# Patient Record
Sex: FEMALE | Race: WHITE | NOT HISPANIC OR LATINO | URBAN - METROPOLITAN AREA
[De-identification: names, ages, dates, MRNs, and addresses within clinical notes are randomized per-mention and may not be internally consistent; named-entity substitution may affect disease eponyms.]

---

## 2020-07-07 ENCOUNTER — OFFICE (OUTPATIENT)
Dept: URBAN - METROPOLITAN AREA LAB 2 | Facility: LAB | Age: 65
End: 2020-07-07
Payer: COMMERCIAL

## 2020-07-07 DIAGNOSIS — Z11.59 ENCOUNTER FOR SCREENING FOR OTHER VIRAL DISEASES: ICD-10-CM

## 2020-07-07 PROCEDURE — 87633 RESP VIRUS 12-25 TARGETS: CPT | Performed by: INTERNAL MEDICINE

## 2020-07-07 PROCEDURE — U0002 COVID-19 LAB TEST NON-CDC: HCPCS | Performed by: INTERNAL MEDICINE

## 2020-07-10 ENCOUNTER — AMBULATORY SURGICAL CENTER (OUTPATIENT)
Dept: URBAN - METROPOLITAN AREA SURGERY 17 | Facility: SURGERY | Age: 65
End: 2020-07-10

## 2020-07-10 ENCOUNTER — OFFICE (OUTPATIENT)
Dept: URBAN - METROPOLITAN AREA PATHOLOGY 4 | Facility: PATHOLOGY | Age: 65
End: 2020-07-10
Payer: COMMERCIAL

## 2020-07-10 VITALS
WEIGHT: 230 LBS | SYSTOLIC BLOOD PRESSURE: 88 MMHG | SYSTOLIC BLOOD PRESSURE: 95 MMHG | TEMPERATURE: 98.4 F | DIASTOLIC BLOOD PRESSURE: 76 MMHG | HEART RATE: 70 BPM | HEIGHT: 66 IN | DIASTOLIC BLOOD PRESSURE: 68 MMHG | SYSTOLIC BLOOD PRESSURE: 103 MMHG | DIASTOLIC BLOOD PRESSURE: 66 MMHG | HEART RATE: 67 BPM | DIASTOLIC BLOOD PRESSURE: 53 MMHG | SYSTOLIC BLOOD PRESSURE: 107 MMHG | SYSTOLIC BLOOD PRESSURE: 161 MMHG | OXYGEN SATURATION: 98 % | HEART RATE: 71 BPM | DIASTOLIC BLOOD PRESSURE: 41 MMHG | DIASTOLIC BLOOD PRESSURE: 46 MMHG | SYSTOLIC BLOOD PRESSURE: 100 MMHG | HEART RATE: 62 BPM | RESPIRATION RATE: 7 BRPM | SYSTOLIC BLOOD PRESSURE: 135 MMHG | DIASTOLIC BLOOD PRESSURE: 67 MMHG | RESPIRATION RATE: 15 BRPM | HEART RATE: 61 BPM | HEART RATE: 64 BPM | HEART RATE: 79 BPM | TEMPERATURE: 97 F | DIASTOLIC BLOOD PRESSURE: 60 MMHG | HEART RATE: 63 BPM | RESPIRATION RATE: 16 BRPM | OXYGEN SATURATION: 99 % | OXYGEN SATURATION: 97 % | RESPIRATION RATE: 8 BRPM | SYSTOLIC BLOOD PRESSURE: 125 MMHG | HEART RATE: 72 BPM | RESPIRATION RATE: 1 BRPM | SYSTOLIC BLOOD PRESSURE: 96 MMHG | DIASTOLIC BLOOD PRESSURE: 83 MMHG | SYSTOLIC BLOOD PRESSURE: 93 MMHG | RESPIRATION RATE: 17 BRPM | RESPIRATION RATE: 13 BRPM | SYSTOLIC BLOOD PRESSURE: 84 MMHG | DIASTOLIC BLOOD PRESSURE: 61 MMHG | HEART RATE: 66 BPM | OXYGEN SATURATION: 100 % | HEART RATE: 60 BPM | SYSTOLIC BLOOD PRESSURE: 87 MMHG | HEART RATE: 59 BPM | SYSTOLIC BLOOD PRESSURE: 154 MMHG | SYSTOLIC BLOOD PRESSURE: 174 MMHG | DIASTOLIC BLOOD PRESSURE: 55 MMHG | RESPIRATION RATE: 10 BRPM | HEART RATE: 58 BPM | OXYGEN SATURATION: 94 % | DIASTOLIC BLOOD PRESSURE: 65 MMHG | SYSTOLIC BLOOD PRESSURE: 115 MMHG | OXYGEN SATURATION: 93 % | HEART RATE: 68 BPM

## 2020-07-10 DIAGNOSIS — D12.0 BENIGN NEOPLASM OF CECUM: ICD-10-CM

## 2020-07-10 DIAGNOSIS — K52.89 OTHER SPECIFIED NONINFECTIVE GASTROENTERITIS AND COLITIS: ICD-10-CM

## 2020-07-10 DIAGNOSIS — K31.7 POLYP OF STOMACH AND DUODENUM: ICD-10-CM

## 2020-07-10 DIAGNOSIS — D12.4 BENIGN NEOPLASM OF DESCENDING COLON: ICD-10-CM

## 2020-07-10 DIAGNOSIS — K57.30 DIVERTICULOSIS OF LARGE INTESTINE WITHOUT PERFORATION OR ABS: ICD-10-CM

## 2020-07-10 DIAGNOSIS — K63.5 POLYP OF COLON: ICD-10-CM

## 2020-07-10 DIAGNOSIS — D12.5 BENIGN NEOPLASM OF SIGMOID COLON: ICD-10-CM

## 2020-07-10 DIAGNOSIS — K62.1 RECTAL POLYP: ICD-10-CM

## 2020-07-10 DIAGNOSIS — R10.13 EPIGASTRIC PAIN: ICD-10-CM

## 2020-07-10 DIAGNOSIS — K29.70 GASTRITIS, UNSPECIFIED, WITHOUT BLEEDING: ICD-10-CM

## 2020-07-10 DIAGNOSIS — K31.89 OTHER DISEASES OF STOMACH AND DUODENUM: ICD-10-CM

## 2020-07-10 DIAGNOSIS — D12.8 BENIGN NEOPLASM OF RECTUM: ICD-10-CM

## 2020-07-10 PROBLEM — R19.7 DIARRHEA: Status: ACTIVE | Noted: 2020-07-10

## 2020-07-10 PROBLEM — K21.9 GERD - GASTROESOPHAGEAL REFLUX DISEASE: Status: ACTIVE | Noted: 2020-07-10

## 2020-07-10 LAB
GI HISTOLOGY: A. UNSPECIFIED: (no result)
GI HISTOLOGY: B. SELECT: (no result)
GI HISTOLOGY: C. UNSPECIFIED: (no result)
GI HISTOLOGY: D. UNSPECIFIED: (no result)
GI HISTOLOGY: E. UNSPECIFIED: (no result)
GI HISTOLOGY: F. UNSPECIFIED: (no result)
GI HISTOLOGY: G. UNSPECIFIED: (no result)
GI HISTOLOGY: H. UNSPECIFIED: (no result)
GI HISTOLOGY: PDF REPORT: (no result)

## 2020-07-10 PROCEDURE — 45380 COLONOSCOPY AND BIOPSY: CPT | Mod: 59 | Performed by: INTERNAL MEDICINE

## 2020-07-10 PROCEDURE — 88305 TISSUE EXAM BY PATHOLOGIST: CPT | Performed by: INTERNAL MEDICINE

## 2020-07-10 PROCEDURE — 43239 EGD BIOPSY SINGLE/MULTIPLE: CPT | Performed by: INTERNAL MEDICINE

## 2020-07-10 PROCEDURE — 45385 COLONOSCOPY W/LESION REMOVAL: CPT | Performed by: INTERNAL MEDICINE

## 2020-07-10 NOTE — SERVICEHPINOTES
65 yo F with h/o HTN, anemia, HLD, BEBA, depression, colon polyps. Last CN 2018 at [place] (path- TSA and TA). No fam hx GI d/o that she knows of, though she knows little about her fam hx. c/o multiple GI symptoms. c/o abd pain, swelling, change in BMs, constipation, diarrhea, heartburn, rectal pain, incontinence of stool.Patient reports alternating constipation and explsive diarrhea. She reports black stools at times, though does take Pepto-Bismol (she is unsure of correlation with when she takes it). She reports burning epigastric pain. She does not think she has had EGD before. She occasionally takes NSAIDs. Discussed r/b/a of the procedure with the patient in the pre-op area.

## 2020-07-29 PROBLEM — K63.5 POLYP OF COLON: Status: ACTIVE | Noted: 2020-07-10

## 2020-07-29 PROBLEM — K31.7 POLYP OF STOMACH AND DUODENUM: Status: ACTIVE | Noted: 2020-07-10

## 2020-08-12 RX ORDER — AMLODIPINE BESYLATE 10 MG/1
10 TABLET ORAL
COMMUNITY

## 2020-08-12 RX ORDER — FUROSEMIDE 20 MG/1
20 TABLET ORAL
COMMUNITY
End: 2020-08-13 | Stop reason: ALTCHOICE

## 2020-08-12 RX ORDER — CLONIDINE HYDROCHLORIDE 0.1 MG/1
0.1 TABLET ORAL 2 TIMES DAILY
COMMUNITY
End: 2020-08-13 | Stop reason: ALTCHOICE

## 2020-08-12 RX ORDER — METOPROLOL SUCCINATE 100 MG/1
100 TABLET, EXTENDED RELEASE ORAL
COMMUNITY
End: 2020-08-13 | Stop reason: ALTCHOICE

## 2020-08-12 RX ORDER — BENAZEPRIL HYDROCHLORIDE 40 MG/1
40 TABLET, FILM COATED ORAL
COMMUNITY
End: 2020-08-13 | Stop reason: ALTCHOICE

## 2020-08-13 ENCOUNTER — OFFICE VISIT (OUTPATIENT)
Dept: CARDIOLOGY | Facility: CLINIC | Age: 65
End: 2020-08-13

## 2020-08-13 ENCOUNTER — TELEPHONE (OUTPATIENT)
Dept: CARDIOLOGY | Facility: CLINIC | Age: 65
End: 2020-08-13

## 2020-08-13 VITALS
WEIGHT: 235 LBS | DIASTOLIC BLOOD PRESSURE: 78 MMHG | HEIGHT: 66 IN | HEART RATE: 66 BPM | BODY MASS INDEX: 37.77 KG/M2 | SYSTOLIC BLOOD PRESSURE: 165 MMHG

## 2020-08-13 DIAGNOSIS — E78.5 HYPERLIPIDEMIA, UNSPECIFIED HYPERLIPIDEMIA TYPE: ICD-10-CM

## 2020-08-13 DIAGNOSIS — E66.01 MORBID OBESITY WITH BMI OF 45.0-49.9, ADULT (HCC): ICD-10-CM

## 2020-08-13 DIAGNOSIS — I10 ESSENTIAL HYPERTENSION: Primary | ICD-10-CM

## 2020-08-13 DIAGNOSIS — R06.02 SHORTNESS OF BREATH: ICD-10-CM

## 2020-08-13 PROCEDURE — 99203 OFFICE O/P NEW LOW 30 MIN: CPT | Performed by: INTERNAL MEDICINE

## 2020-08-13 RX ORDER — OLMESARTAN MEDOXOMIL AND HYDROCHLOROTHIAZIDE 40/12.5 40; 12.5 MG/1; MG/1
1 TABLET ORAL DAILY
Qty: 30 TABLET | Refills: 6 | Status: SHIPPED | OUTPATIENT
Start: 2020-08-13 | End: 2021-02-18 | Stop reason: SDUPTHER

## 2020-08-13 RX ORDER — BISOPROLOL FUMARATE AND HYDROCHLOROTHIAZIDE 10; 6.25 MG/1; MG/1
1 TABLET ORAL DAILY
Qty: 30 TABLET | Refills: 6 | Status: SHIPPED | OUTPATIENT
Start: 2020-08-13 | End: 2021-03-15

## 2020-08-13 NOTE — TELEPHONE ENCOUNTER
Patient states she called her insurance and was told that we are out of network    Will send to  and have scheduling call patient back.  ALP

## 2020-08-13 NOTE — PROGRESS NOTES
New pt   bp    Subjective:        [facility]  Cardiology Consult Note    Patient Identification:  Name: Linette Vergara  Age: 64 y.o.  Sex: female  :  1955  MRN: [id number]             CC  H/o stress cardiomyopathy  htn  For 7 yr    bp not well controlled    sob      History of Present Illness:   64-year-old female was diagnosed to have stress cardiomyopathy approximately 10 to 15 years ago now has been complaining of shortness of breath    Linetet Vergara has been complaining of the shortness of breath mild-to-moderate in intensity with mild-to-moderate usually relieved with rest associated with anxiety and fatigue      Comorbid cardiac risk factors:     Past Medical History:  Past Medical History:   Diagnosis Date   • Hypertension    • Sleep apnea      Past Surgical History:  Past Surgical History:   Procedure Laterality Date   • BILATERAL BREAST REDUCTION     • GALLBLADDER SURGERY     • HYSTERECTOMY     • TONSILLECTOMY        Allergies:  Allergies   Allergen Reactions   • Codeine Itching   • Morphine Itching     Home Meds:    (Not in a hospital admission)  Current Meds:   [unfilled]  Social History:   Social History     Tobacco Use   • Smoking status: Former Smoker   • Smokeless tobacco: Never Used   Substance Use Topics   • Alcohol use: Yes     Comment: OCCASSIONAL      Family History:  Family History   Problem Relation Age of Onset   • Heart failure Mother    • Hypertension Mother         Review of Systems    Constitutional: No weakness,fatigue, fever, rigors, chills   Eyes: No vision changes, eye pain   ENT/oropharynx: No difficulty swallowing, sore throat, epistaxis, changes in hearing   Cardiovascular: No chest pain, chest tightness, palpitations, paroxysmal nocturnal dyspnea, orthopnea, diaphoresis, dizziness / syncopal episode   Respiratory: No shortness of breath, dyspnea on exertion, cough, wheezing hemoptysis   Gastrointestinal: No abdominal pain, nausea, vomiting, diarrhea, bloody  stools   Genitourinary: No hematuria, dysuria   Neurological: No headache, tremors, numbness,  one-sided weakness    Musculoskeletal: No cramps, myalgias,  joint pain, joint swelling   Integument: No rash, edema       IMPRESSION OF HEART CATHETERIZATION:   1. Minimal epicardial coronary arteries as outlined above  2. Reduced left ventricular systolic function.   3. Elevated left ventricular filling pressures   4. Findings consistent with Takotsubo cardioyopathy   RECOMMENDATION:   1. Aggressive risk factor modification   2. Maximization of medical management     Constitutional:  Heart Rate:  [66] 66  BP: (165)/(78) 165/78    Physical Exam   General:  Appears in no acute distress  Eyes: PERTL,  HEENT:  No JVD. Thyroid not visibly enlarged. No mucosal pallor or cyanosis  Respiratory: Respirations regular and unlabored at rest. BBS with good air entry in all fields. No crackles, rubs or wheezes auscultated  Cardiovascular: S1S2 Regular rate and rhythm. No murmur, rub or gallop auscultated. No carotid bruits. DP/PT pulses    . No pretibial pitting edema  Gastrointestinal: Abdomen soft, flat, non tender. Bowel sounds present. No hepatosplenomegaly. No ascites  Musculoskeletal: SHANKS x4. No abnormal movements  Extremities: No digital clubbing or cyanosis  Skin: Color pink. Skin warm and dry to touch. No rashes  No xanthoma  Neuro: AAO x3 CN II-XII grossly intact          Procedures        Cardiographics  ECG:     Telemetry:    Echocardiogram:     Imaging  Chest X-ray:     Lab Review               @[email or web address]@              Assessment:/ Recommendations / Plan:   Patient Active Problem List   Diagnosis   • Essential hypertension   • Morbid obesity with BMI of 45.0-49.9, adult (CMS/[facility])   • Hyperlipidemia                    ICD-10-CM ICD-9-CM   1. Essential hypertension I10 401.9   2. Shortness of breath R06.02 786.05   3. Morbid obesity with BMI of 45.0-49.9, adult (CMS/[facility]) E66.01 278.01    Z68.42 V85.42   4.  Hyperlipidemia, unspecified hyperlipidemia type E78.5 272.4     1. Shortness of breath  Considering the patient's symptoms as well as clinical situation and  EKG findings, along with cardiac risk factors, ischemic workup is necessary to rule out ischemic cardiomyopathy, stress induced arrhythmias, and functional capacity for diagnosis as well as prognostic consideration      2. Essential hypertension  Considering patient's medical condition as well as the risk factors, patient will require echocardiogram for further evaluation for the LV function, four-chamber evaluation, including the pressures, valvular function and  pericardial disease and pericardial effusion    - Stress Test With Myocardial Perfusion One Day  - Adult Transthoracic Echo Complete W/ Cont if Necessary Per Protocol  - Duplex Renal Artery - Bilateral Complete CAR    3. Morbid obesity with BMI of 45.0-49.9, adult (CMS/[facility])  Significant risk of obesity to CAD, HTN has been explained  Advantages of wt reduction has been explained    - Stress Test With Myocardial Perfusion One Day  - Adult Transthoracic Echo Complete W/ Cont if Necessary Per Protocol  - Duplex Renal Artery - Bilateral Complete CAR    4. Hyperlipidemia, unspecified hyperlipidemia type  Continue current treatment  - Stress Test With Myocardial Perfusion One Day  - Adult Transthoracic Echo Complete W/ Cont if Necessary Per Protocol  - Duplex Renal Artery - Bilateral Complete CAR      Diet    Wt loss    Echo, lexisca, renal artery stenosis    [address] diet    45 min exercise    See in 1 month    Labs/tests ordered for am    Elli Toro MD  8/13/2020, 15:00      EMR Dragon/Transcription:   Dictated utilizing Dragon dictation

## 2020-08-14 ENCOUNTER — TELEPHONE (OUTPATIENT)
Dept: CARDIOLOGY | Facility: CLINIC | Age: 65
End: 2020-08-14

## 2020-08-14 NOTE — TELEPHONE ENCOUNTER
----- Message from Pili Doyle RN sent at 8/13/2020  6:01 PM EDT -----  Regarding: STRESS TEST AND ECHO  Contact: [phone]  She said she called [employer] to inquire what would be covered.   She said [employer] told her we are out of network and she would be responsible for 100% because they could not find us.  I told her they were most likely looking for the office and that we are testing through the hospital      Please call her to clarify and assist with this-maybe an authorization needs to be created so her insurance something to look at   Thank you  Pili  ----- Message -----  From: Nandini Porras  Sent: 8/13/2020   4:05 PM EDT  To: Tracy Nolasco [facility] Clinical Pool  Subject: JUST HERE WHAT MEDS DOES SHE NEED TO STOP?         8/14/20 LVM TO CALL ME BACK RE: INS

## 2020-08-14 NOTE — TELEPHONE ENCOUNTER
"I am the on-call provider.  New patient of Dr. Toro yesterday.  He has had hypertension.  He has had swelling in hands and feet.  Had recent med changes.  She has been having intermittent periods where she feels \"horrible\" with intermittent headaches on the top of her head with facial flushing intermittently.  Looks like her meds were adjusted and she was started on bisoprolol HCTZ and olmesartan HCTZ.  Her amlodipine was continued.  Her furosemide, metoprolol succinate, benazepril and clonidine were all stopped yesterday and she started on a new regimen today.  She had pharmacist check her blood pressure earlier it was 173/85.  She was having these episodes before she saw Dr. Toro and did not know she was supposed to continue having them.  He has a work-up ordered including stress test and echocardiogram as well as renal artery duplex.  Advised her to go home and take her next blood pressure medicine that she was due to take and continue to monitor her blood pressure.  If she gets worse over the weekend she will need to be physically evaluated likely urgent care or ER.  She will call back Monday to update Dr. Toro on BP and symptoms for further plan of care.  "

## 2020-08-15 PROBLEM — R06.02 SHORTNESS OF BREATH: Status: ACTIVE | Noted: 2020-08-15

## 2020-08-17 ENCOUNTER — TELEPHONE (OUTPATIENT)
Dept: CARDIOLOGY | Facility: CLINIC | Age: 65
End: 2020-08-17

## 2020-08-17 NOTE — TELEPHONE ENCOUNTER
PATIENT B/P BETTER TODAY . SHES GOING TO TRY SWITCHING TIMES WHEN SHE TAKES IT TO SEE IF THAT WILL HELP

## 2021-02-18 RX ORDER — OLMESARTAN MEDOXOMIL AND HYDROCHLOROTHIAZIDE 40/12.5 40; 12.5 MG/1; MG/1
1 TABLET ORAL DAILY
Qty: 90 TABLET | Refills: 0 | Status: SHIPPED | OUTPATIENT
Start: 2021-02-18 | End: 2021-06-04

## 2021-03-15 RX ORDER — BISOPROLOL FUMARATE AND HYDROCHLOROTHIAZIDE 10; 6.25 MG/1; MG/1
TABLET ORAL
Qty: 30 TABLET | Refills: 0 | Status: SHIPPED | OUTPATIENT
Start: 2021-03-15 | End: 2021-04-12

## 2021-03-16 ENCOUNTER — BULK ORDERING (OUTPATIENT)
Dept: CASE MANAGEMENT | Facility: OTHER | Age: 66
End: 2021-03-16

## 2021-03-16 DIAGNOSIS — Z23 IMMUNIZATION DUE: ICD-10-CM

## 2021-04-12 RX ORDER — BISOPROLOL FUMARATE AND HYDROCHLOROTHIAZIDE 10; 6.25 MG/1; MG/1
TABLET ORAL
Qty: 30 TABLET | Refills: 3 | Status: SHIPPED | OUTPATIENT
Start: 2021-04-12 | End: 2021-04-14

## 2021-04-15 RX ORDER — BISOPROLOL FUMARATE AND HYDROCHLOROTHIAZIDE 10; 6.25 MG/1; MG/1
TABLET ORAL
Qty: 30 TABLET | Refills: 3 | Status: SHIPPED | OUTPATIENT
Start: 2021-04-15 | End: 2021-10-26 | Stop reason: SDUPTHER

## 2021-06-04 RX ORDER — OLMESARTAN MEDOXOMIL AND HYDROCHLOROTHIAZIDE 40/12.5 40; 12.5 MG/1; MG/1
TABLET ORAL
Qty: 90 TABLET | Refills: 0 | Status: SHIPPED | OUTPATIENT
Start: 2021-06-04 | End: 2021-09-03

## 2021-09-03 RX ORDER — OLMESARTAN MEDOXOMIL AND HYDROCHLOROTHIAZIDE 40/12.5 40; 12.5 MG/1; MG/1
TABLET ORAL
Qty: 90 TABLET | Refills: 0 | Status: SHIPPED | OUTPATIENT
Start: 2021-09-03 | End: 2021-12-06

## 2021-09-30 RX ORDER — BISOPROLOL FUMARATE AND HYDROCHLOROTHIAZIDE 10; 6.25 MG/1; MG/1
TABLET ORAL
Qty: 30 TABLET | Refills: 3 | OUTPATIENT
Start: 2021-09-30

## 2021-10-25 RX ORDER — BISOPROLOL FUMARATE AND HYDROCHLOROTHIAZIDE 10; 6.25 MG/1; MG/1
TABLET ORAL
Qty: 30 TABLET | Refills: 3 | OUTPATIENT
Start: 2021-10-25

## 2021-10-26 RX ORDER — BISOPROLOL FUMARATE AND HYDROCHLOROTHIAZIDE 10; 6.25 MG/1; MG/1
1 TABLET ORAL DAILY
Qty: 30 TABLET | Refills: 0 | Status: SHIPPED | OUTPATIENT
Start: 2021-10-26 | End: 2021-11-09

## 2021-11-09 ENCOUNTER — OFFICE VISIT (OUTPATIENT)
Dept: CARDIOLOGY | Facility: CLINIC | Age: 66
End: 2021-11-09

## 2021-11-09 VITALS
HEART RATE: 48 BPM | SYSTOLIC BLOOD PRESSURE: 118 MMHG | OXYGEN SATURATION: 98 % | BODY MASS INDEX: 37.77 KG/M2 | DIASTOLIC BLOOD PRESSURE: 68 MMHG | WEIGHT: 235 LBS | HEIGHT: 66 IN

## 2021-11-09 DIAGNOSIS — I10 ESSENTIAL HYPERTENSION: ICD-10-CM

## 2021-11-09 DIAGNOSIS — E78.5 HYPERLIPIDEMIA, UNSPECIFIED HYPERLIPIDEMIA TYPE: ICD-10-CM

## 2021-11-09 DIAGNOSIS — E66.9 OBESITY (BMI 30-39.9): ICD-10-CM

## 2021-11-09 DIAGNOSIS — R00.1 BRADYCARDIA, SINUS: Primary | ICD-10-CM

## 2021-11-09 PROCEDURE — 93000 ELECTROCARDIOGRAM COMPLETE: CPT

## 2021-11-09 PROCEDURE — 99214 OFFICE O/P EST MOD 30 MIN: CPT

## 2021-11-09 RX ORDER — ESCITALOPRAM OXALATE 5 MG/1
TABLET ORAL
COMMUNITY

## 2021-11-09 RX ORDER — CLOBETASOL PROPIONATE 0.5 MG/G
OINTMENT TOPICAL
COMMUNITY
Start: 2021-10-27

## 2021-11-09 RX ORDER — GLIMEPIRIDE 4 MG/1
TABLET ORAL
COMMUNITY
Start: 2021-09-08

## 2021-11-09 RX ORDER — PIOGLITAZONEHYDROCHLORIDE 15 MG/1
TABLET ORAL DAILY
COMMUNITY

## 2021-11-09 RX ORDER — BISOPROLOL FUMARATE AND HYDROCHLOROTHIAZIDE 10; 6.25 MG/1; MG/1
0.5 TABLET ORAL DAILY
Qty: 30 TABLET | Refills: 6 | Status: SHIPPED | OUTPATIENT
Start: 2021-11-09 | End: 2022-11-21

## 2021-11-09 NOTE — PROGRESS NOTES
Subjective:        Linette Vergara is a 65 y.o. female who here for follow up    Chief Complaint   Patient presents with   • Follow-up     1 yr      Hypertension  HPI  This is a 65-year-old female, who is new to me.  She has a history of hypertension, hyperlipidemia, obesity.  She was last seen in August 2020 as a new patient for hypertension management.  She had stress test and echo ordered but never had completed due to out-of-pocket cost.    She follows up in office today for hypertension.  She denies any chest pain or shortness of breath.  She denies any syncopal episodes or near syncopal episodes.    The following portions of the patient's history were reviewed and updated as appropriate: allergies, current medications, past family history, past medical history, past social history, past surgical history and problem list.    Past Medical History:   Diagnosis Date   • Hypertension    • Sleep apnea          reports that she has quit smoking. She has never used smokeless tobacco. She reports current alcohol use. She reports that she does not use drugs.     Family History   Problem Relation Age of Onset   • Heart failure Mother    • Hypertension Mother        ROS     Review of Systems  Constitutional: No wt loss, fever, fatigue  Gastrointestinal: No nausea, abdominal pain  Behavioral/Psych: No insomnia or anxiety  Cardiovascular no chest pain or shortness of breath      Objective:           Vitals and nursing note reviewed.   Constitutional:       Appearance: Well-developed.   HENT:      Head: Normocephalic.      Right Ear: External ear normal.      Left Ear: External ear normal.   Neck:      Vascular: No JVD.   Pulmonary:      Effort: Pulmonary effort is normal. No respiratory distress.      Breath sounds: Normal breath sounds. No stridor. No rales.   Cardiovascular:      Bradycardia present. Regular rhythm.      No gallop.   Pulses:     Intact distal pulses.   Abdominal:      General: Bowel sounds are normal.  There is no distension.      Palpations: Abdomen is soft.      Tenderness: There is no abdominal tenderness. There is no guarding.   Musculoskeletal: Normal range of motion.         General: No tenderness.      Cervical back: Normal range of motion. Skin:     General: Skin is warm.   Neurological:      Mental Status: Alert and oriented to person, place, and time.      Deep Tendon Reflexes: Reflexes are normal and symmetric.   Psychiatric:         Judgment: Judgment normal.           ECG 12 Lead    Date/Time: 11/9/2021 9:31 AM  Performed by: Pili Doyle APRN  Authorized by: Pili Doyle APRN   Comparison: compared with previous ECG from 8/13/2020  Similar to previous ECG  Comparison to previous ECG: Bradycardic on ECG today.  Rhythm: sinus bradycardia  Rate: bradycardic  BPM: 48  Other findings: non-specific ST-T wave changes    Clinical impression: abnormal EKG                  Current Outpatient Medications:   •  amLODIPine (NORVASC) 10 MG tablet, 10 mg., Disp: , Rfl:   •  bisoprolol-hydrochlorothiazide (ZIAC) 10-6.25 MG per tablet, Take 1 tablet by mouth Daily., Disp: 30 tablet, Rfl: 0  •  clobetasol (TEMOVATE) 0.05 % ointment, , Disp: , Rfl:   •  escitalopram (LEXAPRO) 5 MG tablet, escitalopram 5 mg tablet  TAKE ONE TABLET BY MOUTH DAILY, Disp: , Rfl:   •  esomeprazole (nexIUM) 20 MG capsule, 20 mg., Disp: , Rfl:   •  glimepiride (AMARYL) 4 MG tablet, , Disp: , Rfl:   •  olmesartan-hydrochlorothiazide (BENICAR HCT) 40-12.5 MG per tablet, TAKE ONE TABLET BY MOUTH DAILY, Disp: 90 tablet, Rfl: 0  •  pioglitazone (ACTOS) 15 MG tablet, Daily., Disp: , Rfl:   •  Probiotic Product ([employer] PO), , Disp: , Rfl:      Assessment:        Patient Active Problem List   Diagnosis   • Essential hypertension   • Morbid obesity with BMI of 45.0-49.9, adult (HCC)   • Hyperlipidemia   • Shortness of breath               Plan:   1. Hypertension: BP controlled, however HR 48 today.  Continue amlodipine,  bisoprolol, and olmesartan.  She was supposed to have an echo and stress test last year but never did due to out-of-pocket cost.  She states her insurance is changed and she will look into out-of-pocket cost to have these test done.    Importance of controlling hypertension and blood pressure  checkup on the regular basis has been explained. Hypertension as a silent killer has been discussed. Risk reduction of the weight and regular exercises to control the hypertension has been explained.    2. Bradycardia: Heart rate today 48.  She denies any chest pain, shortness of breath, syncope/near syncope.  Last office visit was 66.  She does not routinely check her heart rate at home with her BP.  ECG today shows SB, I will order a 2 week holter.  I will cut her Ziac in half.  She will check BP and heart rate at home and keep a diary.    Pros and cons of this new medication / change medication has been explained to  the patient. Possible side effects has been explained. Associated need of the blood  Work has been explained. Need for the compliance of the medication has been explained.    3. Hyperlipidemia: She reports her PCP manages her cholesterol labs.    Risk of the hyperlipidemia, importance of the treatment has been explained. Pros and cons of the statins has been explained. Regular blood workup as well as side effects including the liver failure, myelopathy death has been explained.    4. Obesity: BMI 37.93    Significant risk of obesity to CAD, HTN has been explained  Advantages of wt reduction has been explained                 No diagnosis found.    There are no diagnoses linked to this encounter.    COUNSELING:rebeca Salamanca was given to patient for the following topics: diagnostic results, risk factor reductions, impressions, risks and benefits of treatment options and importance of treatment compliance .       SMOKING COUNSELING:denies    She will wear 2-week Holter , I have decreased  bisoprolol to 1/2 tablet 5/3.25mg  She would like to review with insurance prior to stress and echo. Strongly encourgaed her to have stress and echo.  She also is to check with her insurance before wearing 2-week Holter.      Sincerely,   RAFA Rich  [facility]  11/09/21  09:30 EST    EMR Dragon/Transcription disclaimer:   Much of this encounter note is an electronic transcription/translation of spoken language to printed text. The electronic translation of spoken language may permit erroneous, or at times, nonsensical words or phrases to be inadvertently transcribed; Although I have reviewed the note for such errors, some may still exist.

## 2021-11-22 RX ORDER — BISOPROLOL FUMARATE AND HYDROCHLOROTHIAZIDE 10; 6.25 MG/1; MG/1
TABLET ORAL
Qty: 30 TABLET | Refills: 6 | OUTPATIENT
Start: 2021-11-22

## 2021-12-06 RX ORDER — OLMESARTAN MEDOXOMIL AND HYDROCHLOROTHIAZIDE 40/12.5 40; 12.5 MG/1; MG/1
TABLET ORAL
Qty: 90 TABLET | Refills: 1 | Status: SHIPPED | OUTPATIENT
Start: 2021-12-06 | End: 2022-06-01

## 2021-12-28 ENCOUNTER — OFFICE VISIT (OUTPATIENT)
Dept: CARDIOLOGY | Facility: CLINIC | Age: 66
End: 2021-12-28

## 2021-12-28 VITALS
BODY MASS INDEX: 38.25 KG/M2 | HEART RATE: 58 BPM | WEIGHT: 238 LBS | HEIGHT: 66 IN | SYSTOLIC BLOOD PRESSURE: 132 MMHG | DIASTOLIC BLOOD PRESSURE: 69 MMHG

## 2021-12-28 DIAGNOSIS — R00.1 BRADYCARDIA, SINUS: ICD-10-CM

## 2021-12-28 DIAGNOSIS — I10 ESSENTIAL HYPERTENSION: ICD-10-CM

## 2021-12-28 DIAGNOSIS — E78.5 HYPERLIPIDEMIA, UNSPECIFIED HYPERLIPIDEMIA TYPE: ICD-10-CM

## 2021-12-28 DIAGNOSIS — Z71.2 ENCOUNTER TO DISCUSS TEST RESULTS: Primary | ICD-10-CM

## 2021-12-28 DIAGNOSIS — E66.9 OBESITY (BMI 30-39.9): ICD-10-CM

## 2021-12-28 PROCEDURE — 99212 OFFICE O/P EST SF 10 MIN: CPT | Performed by: NURSE PRACTITIONER

## 2021-12-28 RX ORDER — LANOLIN ALCOHOL/MO/W.PET/CERES
1000 CREAM (GRAM) TOPICAL DAILY
COMMUNITY
End: 2022-08-12

## 2021-12-28 NOTE — PROGRESS NOTES
Subjective:        Linette Vergara is a 66 y.o. female who here for follow up    Chief Complaint   Patient presents with   • Results     Holter Results        HPI    This is a 76-year-old female, who is known to me.  She is here to go over culture results which was essentially a normal study.  Her heart rate within the study with the high was 66 and the lowest 44.  She was offered stress test and echo previously and has declined today as well.  She states she is not having any shortness of breath or chest pain at this time.    She had a cardiac cath in 2013 which showed reduced LV systolic function.  Findings were consistent with Takotsubo cardiomyopathy.  Echo at that time revealed EF 60%, LV chamber size is normal, LV systolic function appears grossly normal and no major valvular abnormalities noted.    The following portions of the patient's history were reviewed and updated as appropriate: allergies, current medications, past family history, past medical history, past social history, past surgical history and problem list.    Past Medical History:   Diagnosis Date   • Hypertension    • Sleep apnea          reports that she has quit smoking. She has never used smokeless tobacco. She reports current alcohol use. She reports that she does not use drugs.     Family History   Problem Relation Age of Onset   • Heart failure Mother    • Hypertension Mother        ROS     Review of Systems  Constitutional: No wt loss, fever, fatigue  Gastrointestinal: No nausea, abdominal pain  Behavioral/Psych: No insomnia or anxiety  Cardiovascular: Denies chest pain, shortness of breath      Objective:           Vitals and nursing note reviewed.   Constitutional:       Appearance: Well-developed.   HENT:      Head: Normocephalic.      Right Ear: External ear normal.      Left Ear: External ear normal.   Neck:      Vascular: No JVD.   Pulmonary:      Effort: Pulmonary effort is normal. No respiratory distress.      Breath sounds:  Normal breath sounds. No stridor. No rales.   Cardiovascular:      Normal rate. Regular rhythm.      No gallop.   Pulses:     Intact distal pulses.   Abdominal:      General: Bowel sounds are normal. There is no distension.      Palpations: Abdomen is soft.      Tenderness: There is no abdominal tenderness. There is no guarding.   Musculoskeletal: Normal range of motion.         General: No tenderness.      Cervical back: Normal range of motion. Skin:     General: Skin is warm.   Neurological:      Mental Status: Alert and oriented to person, place, and time.      Deep Tendon Reflexes: Reflexes are normal and symmetric.   Psychiatric:         Judgment: Judgment normal.         Procedures    2013  IMPRESSION OF HEART CATHETERIZATION:   1. Minimal epicardial coronary arteries as outlined above   2. Reduced left ventricular systolic function.   3. Elevated left ventricular filling pressures   4. Findings consistent with Takotsubo cardioyopathy   RECOMMENDATION:   1. Aggressive risk factor modification   2. Maximization of medical management   2013  Conclusions:   The left ventricular chamber size is normal.   The left ventricular systolic function appears grossly normal.   The EF was calculated at 60%.   There are no major valvular abnormalities.          Current Outpatient Medications:   •  amLODIPine (NORVASC) 10 MG tablet, 10 mg., Disp: , Rfl:   •  BIOTIN PO, Take  by mouth., Disp: , Rfl:   •  bisoprolol-hydrochlorothiazide (ZIAC) 10-6.25 MG per tablet, Take 0.5 tablets by mouth Daily., Disp: 30 tablet, Rfl: 6  •  clobetasol (TEMOVATE) 0.05 % ointment, , Disp: , Rfl:   •  escitalopram (LEXAPRO) 5 MG tablet, escitalopram 5 mg tablet  TAKE ONE TABLET BY MOUTH DAILY, Disp: , Rfl:   •  esomeprazole (nexIUM) 20 MG capsule, 20 mg., Disp: , Rfl:   •  glimepiride (AMARYL) 4 MG tablet, , Disp: , Rfl:   •  olmesartan-hydrochlorothiazide (BENICAR HCT) 40-12.5 MG per tablet, TAKE ONE TABLET BY MOUTH DAILY, Disp: 90 tablet, Rfl:  1  •  pioglitazone (ACTOS) 15 MG tablet, Daily., Disp: , Rfl:   •  Probiotic Product ([employer] PO), , Disp: , Rfl:   •  vitamin B-12 (CYANOCOBALAMIN) 1000 MCG tablet, Take 1,000 mcg by mouth Daily., Disp: , Rfl:      Assessment:        Patient Active Problem List   Diagnosis   • Essential hypertension   • Morbid obesity with BMI of 45.0-49.9, adult (HCC)   • Hyperlipidemia   • Shortness of breath               Plan:   1.  Test results: Holter study was essentially a normal study.  See full report as above.  Discussed with patient thoroughly.  She will continue her decrease Ziac dose.  Blood pressure and heart rate are stable.  Continue amlodipine, Ziac and Benicar.    2.  Bradycardia: Today that her heart rate is 58.  She states she has been monitoring her heart rate while at home and it has been within normal limits.    3 shortness of breath on exertion: She states it does not happen very often.  Currently she is not having any shortness of breath.  She has declined an echo or stress test at this time.  He states if she starts having concerns of shortness of breath or chest pain she will call the office.    Return to ER per EMS for any recurrent symptoms including, but not limited to: chest pain/pressure/tightness, weakness, Shortness of breath, dizziness, palpitations, near syncope or syncope    4.  Hyperlipidemia: Her primary care physician manages her cholesterol labs.    Risk of the hyperlipidemia, importance of the treatment has been explained. Pros and cons of the statins has been explained. Regular blood workup as well as side effects including the liver failure, myelopathy death has been explained.      5.  Obesity: BMI 38.41    Counseled on need to work toward healthy BMI, diet and exercise modifications and strategies that may work for him.  Health consequences of high BMI discussed. Low fat, lower carb diet reviewed.  I recommended 30 minutes of aerobic exercise 5 times a week. I recommended  healthy diet with lower unhealthy fats, higher healthy fats and lower in carbs and higher in protein with adequate hydration.    6.  Hypertension: Blood pressure is stable.    Educated patient on exercising for at least 30 minutes a day for 2 to 3 days a week. Importance of controlling hypertension and blood pressure checkup on the regular basis has been explained. Hypertension as a silent killer has been discussed. Risk reduction of the weight and regular exercises to control the hypertension has been explained.               No diagnosis found.    There are no diagnoses linked to this encounter.    COUNSELING: Rakel Salamanca was given to patient for the following topics: diagnostic results, risk factor reductions, impressions, risks and benefits of treatment options and importance of treatment compliance .       SMOKING COUNSELING:    We will follow-up in 6 months, unless she needs to be seen sooner.    Sincerely,   RAFA Roberts  [facility]  12/28/21  08:41 EST    LISBETH Dragon/Transcription disclaimer:   Much of this encounter note is an electronic transcription/translation of spoken language to printed text. The electronic translation of spoken language may permit erroneous, or at times, nonsensical words or phrases to be inadvertently transcribed; Although I have reviewed the note for such errors, some may still exist.

## 2022-06-01 RX ORDER — OLMESARTAN MEDOXOMIL AND HYDROCHLOROTHIAZIDE 40/12.5 40; 12.5 MG/1; MG/1
TABLET ORAL
Qty: 90 TABLET | Refills: 1 | Status: SHIPPED | OUTPATIENT
Start: 2022-06-01 | End: 2022-11-21

## 2022-08-12 ENCOUNTER — OFFICE VISIT (OUTPATIENT)
Dept: CARDIOLOGY | Facility: CLINIC | Age: 67
End: 2022-08-12

## 2022-08-12 VITALS
HEIGHT: 66 IN | DIASTOLIC BLOOD PRESSURE: 73 MMHG | BODY MASS INDEX: 38.89 KG/M2 | SYSTOLIC BLOOD PRESSURE: 122 MMHG | WEIGHT: 242 LBS | HEART RATE: 54 BPM

## 2022-08-12 DIAGNOSIS — R00.1 BRADYCARDIA, SINUS: ICD-10-CM

## 2022-08-12 DIAGNOSIS — I10 ESSENTIAL HYPERTENSION: Primary | ICD-10-CM

## 2022-08-12 DIAGNOSIS — E78.5 HYPERLIPIDEMIA, UNSPECIFIED HYPERLIPIDEMIA TYPE: ICD-10-CM

## 2022-08-12 DIAGNOSIS — E66.9 OBESITY (BMI 30-39.9): ICD-10-CM

## 2022-08-12 PROCEDURE — 99214 OFFICE O/P EST MOD 30 MIN: CPT

## 2022-08-12 NOTE — PROGRESS NOTES
Subjective:        Linette Vregara is a 66 y.o. female who here for follow up    Chief Complaint   Patient presents with   • Follow-up     6 month        HPI       Ms Linette Vergara is a 65-year-old female who is known to this provider with hypertension, hyperlipidemia, obesity. Holter monitor 11/11/2021 was a normal monitor study with rare PACs and PVCs, minimum heart rate 44 average was 61..  Cardiac catheterization in 2013 normal left main, LAD minimum irregularity, circumflex minimum irregularity, RCA dominant minimal irregularity.  Recommendations at that time risk factor modifications.  Echo 3/17/2013 EF 60%, normal LV function.  She was seen August 2020 and recommended stress test and echo for which she declined at the time.    11/9/2021 seen in office his follow-up, noted bradycardia and Ziac decreased and Holter monitor ordered.    12/28/2021 followed up in office for Holter monitor and recommended to continue decreased Ziac dose and follow-up in 6 months.      The following portions of the patient's history were reviewed and updated as appropriate: allergies, current medications, past family history, past medical history, past social history, past surgical history and problem list.    Past Medical History:   Diagnosis Date   • Hypertension    • Sleep apnea          reports that she has quit smoking. She has never used smokeless tobacco. She reports current alcohol use. She reports that she does not use drugs.     Family History   Problem Relation Age of Onset   • Heart failure Mother    • Hypertension Mother        ROS     Review of Systems  Constitutional: no wt loss, fever, fatigue  Gastrointestinal: no nausea, abdominal pain  Behavioral/Psych: no insomnia or anxiety  Cardiovascular no chest pain or shortness of breath      Objective:           Vitals and nursing note reviewed.   Constitutional:       Appearance: Well-developed.   HENT:      Head: Normocephalic.      Right Ear: External ear normal.       Left Ear: External ear normal.   Neck:      Vascular: No JVD.   Pulmonary:      Effort: Pulmonary effort is normal. No respiratory distress.      Breath sounds: Normal breath sounds. No stridor. No rales.   Cardiovascular:      Normal rate. Regular rhythm.      No gallop.   Pulses:     Intact distal pulses.   Abdominal:      General: Bowel sounds are normal. There is no distension.      Palpations: Abdomen is soft.      Tenderness: There is no abdominal tenderness. There is no guarding.   Musculoskeletal: Normal range of motion.         General: No tenderness.      Cervical back: Normal range of motion. Skin:     General: Skin is warm.   Neurological:      Mental Status: Alert and oriented to person, place, and time.      Deep Tendon Reflexes: Reflexes are normal and symmetric.   Psychiatric:         Judgment: Judgment normal.         Procedures    Interpretation Summary    · Linette Vergara monitored for 12d starting on 11/9/2021 The average heart rate, excluding ectopy, was 61 BPM with a minumim of 44 BPM on Day 10 and a maximum of 111 BPM on Day 6. SVE: Pilger was 0.64% , max count per 24 hours 558 PVC: burden was 0.04%, max count per 24 hours 32 ,1 disparate morphologies  · A normal monitor study.  · NSR WITH RARE PAC AND RARE PVC    Conclusions:   The left ventricular chamber size is normal.   The left ventricular systolic function appears grossly normal.   The EF was calculated at 60%.   There are no major valvular abnormalities.   IMPRESSION OF HEART CATHETERIZATION:   1. Minimal epicardial coronary arteries as outlined above   2. Reduced left ventricular systolic function.   3. Elevated left ventricular filling pressures   4. Findings consistent with Takotsubo cardioyopathy   RECOMMENDATION:   1. Aggressive risk factor modification   2. Maximization of medical management         Current Outpatient Medications:   •  amLODIPine (NORVASC) 10 MG tablet, 10 mg., Disp: , Rfl:   •  BIOTIN PO, Take  by mouth., Disp: ,  Rfl:   •  bisoprolol-hydrochlorothiazide (ZIAC) 10-6.25 MG per tablet, Take 0.5 tablets by mouth Daily., Disp: 30 tablet, Rfl: 6  •  clobetasol (TEMOVATE) 0.05 % ointment, , Disp: , Rfl:   •  escitalopram (LEXAPRO) 5 MG tablet, escitalopram 5 mg tablet  TAKE ONE TABLET BY MOUTH DAILY, Disp: , Rfl:   •  esomeprazole (nexIUM) 20 MG capsule, 20 mg., Disp: , Rfl:   •  glimepiride (AMARYL) 4 MG tablet, , Disp: , Rfl:   •  olmesartan-hydrochlorothiazide (BENICAR HCT) 40-12.5 MG per tablet, TAKE ONE TABLET BY MOUTH DAILY, Disp: 90 tablet, Rfl: 1  •  pioglitazone (ACTOS) 15 MG tablet, Daily., Disp: , Rfl:   •  Probiotic Product ([employer] PO), , Disp: , Rfl:      Assessment:        Patient Active Problem List   Diagnosis   • Essential hypertension   • Morbid obesity with BMI of 45.0-49.9, adult (HCC)   • Hyperlipidemia   • Shortness of breath               Plan:   1. Hypertension: controlled    Importance of controlling hypertension and blood pressure  checkup on the regular basis has been explained. Hypertension as a silent killer has been discussed. Risk reduction of the weight and regular exercises to control the hypertension has been explained.    2. Bradycardia: improved,  Stable on ziac    3. Hyperlipidemia: She reports her PCP manages her cholesterol and labs.     Risk of the hyperlipidemia, importance of the treatment has been explained. Pros and cons of the statins has been explained. Regular blood workup as well as side effects including the liver failure, myelopathy death has been explained.    4. Obesity:  Body mass index is 39.06 kg/m².  Significant risk of obesity to CAD, HTN has been explained  Advantages of wt reduction has been explained                 No diagnosis found.    There are no diagnoses linked to this encounter.    COUNSELING:rebeca Salamanca was given to patient for the following topics: diagnostic results, risk factor reductions, impressions, risks and benefits of  treatment options and importance of treatment compliance .       SMOKING COUNSELING: denies      Follow up in 1 year or sooner if needed    Sincerely,   RAFA Rich  [facility]  08/12/22  13:56 EDT    EMR Dragon/Transcription disclaimer:   Much of this encounter note is an electronic transcription/translation of spoken language to printed text. The electronic translation of spoken language may permit erroneous, or at times, nonsensical words or phrases to be inadvertently transcribed; Although I have reviewed the note for such errors, some may still exist.

## 2022-11-21 RX ORDER — BISOPROLOL FUMARATE AND HYDROCHLOROTHIAZIDE 10; 6.25 MG/1; MG/1
TABLET ORAL
Qty: 45 TABLET | Refills: 1 | Status: SHIPPED | OUTPATIENT
Start: 2022-11-21

## 2022-11-21 RX ORDER — OLMESARTAN MEDOXOMIL AND HYDROCHLOROTHIAZIDE 40/12.5 40; 12.5 MG/1; MG/1
TABLET ORAL
Qty: 90 TABLET | Refills: 1 | Status: SHIPPED | OUTPATIENT
Start: 2022-11-21

## 2023-05-18 NOTE — TELEPHONE ENCOUNTER
Rx Refill Note  Requested Prescriptions     Pending Prescriptions Disp Refills   • bisoprolol-hydrochlorothiazide (ZIAC) 10-6.25 MG per tablet [Pharmacy Med Name: BISOPROLOL-HCTZ 10-6.25 MG TAB] 45 tablet 1     Sig: TAKE 1/2 TABLET BY MOUTH DAILY      Last office visit with prescribing clinician: 8/12/2022   Last telemedicine visit with prescribing clinician: 11/19/2022   Next office visit with prescribing clinician: Visit date not found                         Would you like a call back once the refill request has been completed: [] Yes [] No    If the office needs to give you a call back, can they leave a voicemail: [] Yes [] No    Hilda Hernandez MA  05/18/23, 08:58 EDT

## 2023-05-18 NOTE — TELEPHONE ENCOUNTER
Rx Refill Note  Requested Prescriptions     Pending Prescriptions Disp Refills   • olmesartan-hydrochlorothiazide (BENICAR HCT) 40-12.5 MG per tablet [Pharmacy Med Name: OLMESARTAN-HCTZ 40-12.5 MG TAB] 90 tablet 1     Sig: TAKE ONE TABLET BY MOUTH DAILY      Last office visit with prescribing clinician: Visit date not found   Last telemedicine visit with prescribing clinician: 5/18/2023   Next office visit with prescribing clinician: Visit date not found                         Would you like a call back once the refill request has been completed: [] Yes [] No    If the office needs to give you a call back, can they leave a voicemail: [] Yes [] No    Hilda Hernandez MA  05/18/23, 08:57 EDT

## 2023-05-19 RX ORDER — BISOPROLOL FUMARATE AND HYDROCHLOROTHIAZIDE 10; 6.25 MG/1; MG/1
TABLET ORAL
Qty: 45 TABLET | Refills: 1 | Status: SHIPPED | OUTPATIENT
Start: 2023-05-19

## 2023-05-19 RX ORDER — OLMESARTAN MEDOXOMIL AND HYDROCHLOROTHIAZIDE 40/12.5 40; 12.5 MG/1; MG/1
TABLET ORAL
Qty: 90 TABLET | Refills: 1 | Status: SHIPPED | OUTPATIENT
Start: 2023-05-19

## 2023-11-28 RX ORDER — OLMESARTAN MEDOXOMIL AND HYDROCHLOROTHIAZIDE 40/12.5 40; 12.5 MG/1; MG/1
1 TABLET ORAL DAILY
Qty: 90 TABLET | Refills: 1 | OUTPATIENT
Start: 2023-11-28

## 2023-11-29 RX ORDER — BISOPROLOL FUMARATE AND HYDROCHLOROTHIAZIDE 10; 6.25 MG/1; MG/1
0.5 TABLET ORAL DAILY
Qty: 45 TABLET | Refills: 0 | Status: SHIPPED | OUTPATIENT
Start: 2023-11-29